# Patient Record
(demographics unavailable — no encounter records)

---

## 2024-12-17 NOTE — REASON FOR VISIT
[Follow-Up] : a follow-up visit [Parent] : parent [TextBox_44] : right sided primary PTX-unprovoked, SOB,? underlying allergies, asymptomatic GERD, poor sleep ? [facility] vs other

## 2024-12-17 NOTE — HISTORY OF PRESENT ILLNESS
[TextBox_4] :  Ms. MANN is a 26 year old female who now comes in for a follow-up pulmonary evaluation for right sided primary PTX-unprovoked, SOB,? underlying allergies, asymptomatic GERD, poor sleep ? [facility] vs other. Her chief complaint is   she notes feeling well  -she notes its hard for her to exercise due to feeling weaker- #1 issue  she notes that her appetite is good.. -she notes menstrual cycle is normal -she notes she doesnt work much during the day as she sits for the most of the day -she ntoes she got the flu shot  -she notes her sense of smell and taste are normal - she notes Her bowels are normal.  -she notes that her diet is good but she doesn't have enough protein     -Patient denies any headaches, nausea, vomiting, fever, chills, sweats, chest pain, chest pressure, palpitations, coughing, wheezing, fatigue, diarrhea, constipation, dysphagia, arthralgias, myalgias, dizziness, leg swelling, leg pain, itchy eyes, itchy ears, dysphonia, heartburn, reflux or sour taste in mouth.

## 2024-12-17 NOTE — PROCEDURE
[FreeTextEntry1] : PFT reveals normal flows; FEV1 was 3.18 L which is 92.2 % of predicted with a normal flow volume loop. PFT's for performed to evaluate for SOB.    FENO was 10; a normal value being less than 25. Fractional exhaled nitric oxide (FENO) is regarded as a simple, noninvasive method for assessing eosinophilic airway inflammation. Produced by a variety of cells within the lung, nitric oxide (NO) concentrations are generally low in healthy individuals. However, high concentrations of NO appear to be involved in nonspecific host defense mechanisms and chronic inflammatory diseases such as asthma. The American Thoracic Society (ATS) therefore has strongly recommended using FENO to aid in the assessment, management, and long-term monitoring of eosinophilic airway inflammation and asthma, and for identifying steroid responsive individuals whose chronic respiratory symptoms may be caused by airway inflammation. In their 2011 clinical practice guideline, the ATS emphasizes the importance of using FENO.

## 2024-12-17 NOTE — ADDENDUM
[FreeTextEntry1] :  Documented by Matthias Padilla acting as a scribe for Dr. Antonio Maldonado on 12/17/2024 .   All medical record entries made by the Scribe were at my, Dr. Antonio Maldonado's direction and personally dictated by me on 12/17/2024 . I have reviewed the chart and agree that the record accurately reflects my personal performance of the history, Physical exam, assessment, and plan. I have also personally directed, reviewed, and agree with the discharge instructions.

## 2024-12-17 NOTE — PHYSICAL EXAM
[No Acute Distress] : no acute distress [Normal Oropharynx] : normal oropharynx [III] : Mallampati Class: III [Normal Appearance] : normal appearance [No Neck Mass] : no neck mass [Normal Rate/Rhythm] : normal rate/rhythm [Normal S1, S2] : normal s1, s2 [No Murmurs] : no murmurs [No Resp Distress] : no resp distress [Clear to Auscultation Bilaterally] : clear to auscultation bilaterally [No Abnormalities] : no abnormalities [Benign] : benign [Normal Gait] : normal gait [No Clubbing] : no clubbing [No Cyanosis] : no cyanosis [No Edema] : no edema [FROM] : FROM [Normal Color/ Pigmentation] : normal color/ pigmentation [No Focal Deficits] : no focal deficits [Oriented x3] : oriented x3 [Normal Affect] : normal affect [TextBox_11] : wax in ear [TextBox_68] :  I:E 1:3; Clear

## 2024-12-17 NOTE — ASSESSMENT
[FreeTextEntry1] : Ms. MANN is a 26 year old female with a history of of Eczema, Reconstructive Jaw surgery due to overbite (with multiple related surgeries), ?IBS (ER visit revealed abnormal CT of pelvis and abdomen which showed atelectasis), ? [facility], COVID 19 (3/2021, 5/2024), Bronchitis- right sided primary PTX-unprovoked, SOB,? underlying allergies, asymptomatic GERD, poor sleep ? [facility] vs other- s/p R robotic surgery (bleb removal)- thriving    The patient's SOB is felt to be multifactorial: -out of shape -poor mechanics of breathing -Pulmonary     - (right sided primary PTX-unprovoked)     -Asthma -Cardiac   Problem 1: right sided primary PTX-unprovoked (spontaneous in nature)- resolved 7/2024 -s/p Alpha 1 antitrypsin level (WNL) -s/p f/p with Thoracic surgeon- contacted Dr. Young- R robotic surgery  -s/p Non contrast CT scan of chest (inspiratory and expiratory films) 7/2024  Problem 1A: ? Asthma  Asthma is believed to be caused by inherited (genetic) and environmental factor, but its exact is unknown. Asthma may be triggered by allergens, lung infections, or irritants in the air, Asthma triggers are different for each person.    Problem 2: Allergies/ Sinus -recommend Lactose Tolerance test- pending -s/p Blood work: asthma panel (+), food IgE panel (+), IgE level, eosinophil level, vitamin D level (WNL)  Environmental measures for allergies were encouraged including mattress and pillow cover, air purifier, and environmental controls.  Problem 3: GERD -recommended functional medicine evaluation- Calderon Farias -Recommend Reflux Gourmet OTC  -Rule of 2s: avoid eating too much, eating too late, eating too spicy, eating two hours before bed. - Things to avoid including overeating, spicy foods, tight clothing, eating within two hours of bed, this list is not all inclusive. - For treatments of reflux, possible options discussed including diet control, H2 blockers, PPIs, as well as coating motility agents discussed as treatment options. Timing of meals and proximity of last meal to sleep were discussed. If symptoms persist, a formal gastrointestinal evaluation is needed.  Problem 3A: Bloat -recommend DFH- Digestive Enzymes ; FD Emani   Problem 4: ? [facility] -complete home sleep study -Sleep apnea is associated with adverse clinical consequences which can affect most organ systems. Cardiovascular disease risk includes arrhythmias, atrial fibrillation, hypertension, coronary artery disease, and stroke. Metabolic disorders include diabetes type 2, non-alcoholic fatty liver disease. Mood disorder especially depression; and cognitive decline especially in the elderly. Associations with chronic reflux/Barretts esophagus some but not all inclusive. -Reasons include arousal consistent with hypopnea; respiratory events most prominent in REM sleep or supine position; therefore sleep staging and body position are important for accurate diagnosis and estimation of AHI.    Problem 5: Cardiac -Recommend cardiac follow up evaluation with cardiologist if needed   Problem 6: out of shape -Recommended Calderon Farias's 10-day detox diet and book. - Weight loss, exercise and diet control were discussed and are highly encouraged. Treatment options were given such as aqua therapy, and contacting a nutritionist. Recommended to use the elliptical, stationary bike, less use of treadmill. Mindful eating was explained to the patient. Obesity is associated with worsening asthma, SOB, and potential for cardiac disease, diabetes, and other underlying medical conditions.   Problem 7: Poor mechanics of breathing -Recommended [place] and [place] breathing techniques -Recommended [email or web address] and to YouTube "aerobic exercises for seniors" -Proper breathing techniques were reviewed with an emphasis on exhalation. Patient instructed to breath in for 1 second and out for four seconds. Patient was encouraged not to talk while walking.   Problem 8: Health Maintenance -recommended Sanotize anti viral nasal spray in case of viral infection -s/p flu shot 2024 -recommended strep pneumonia vaccines: Prevnar-20 vaccine, follow by Pneumo vaccine 23 one year following -recommended early intervention for URIs -recommended regular osteoporosis evaluations -recommended early dermatological evaluations -recommended after the age of 50 to the age of 70, colonoscopy every 5 years   F/P in 3-4 months  pt is encouraged to call or fax the office with any questions or concerns.